# Patient Record
Sex: MALE | Race: WHITE
[De-identification: names, ages, dates, MRNs, and addresses within clinical notes are randomized per-mention and may not be internally consistent; named-entity substitution may affect disease eponyms.]

---

## 2019-01-05 ENCOUNTER — HOSPITAL ENCOUNTER (EMERGENCY)
Dept: HOSPITAL 25 - UCEAST | Age: 64
Discharge: HOME | End: 2019-01-05
Payer: MEDICARE

## 2019-01-05 VITALS — SYSTOLIC BLOOD PRESSURE: 137 MMHG | DIASTOLIC BLOOD PRESSURE: 88 MMHG

## 2019-01-05 DIAGNOSIS — Z79.899: ICD-10-CM

## 2019-01-05 DIAGNOSIS — E03.9: ICD-10-CM

## 2019-01-05 DIAGNOSIS — H10.9: Primary | ICD-10-CM

## 2019-01-05 DIAGNOSIS — H66.91: ICD-10-CM

## 2019-01-05 PROCEDURE — G0463 HOSPITAL OUTPT CLINIC VISIT: HCPCS

## 2019-01-05 PROCEDURE — 99212 OFFICE O/P EST SF 10 MIN: CPT

## 2019-01-05 NOTE — UC
Throat Pain/Nasal Toan HPI





- HPI Summary


HPI Summary: 


63 year old man with disability from past concussion and left carotid dissection

, with 2 week history of sinus congestion with blood streaked nasal discharge. 

Using daily saline spray with some relief, but in the past 2 days has increase 

in eye discharge without photophobia or visual blurring. Left ear with mild 

pain and blunted hearing. Grandchildren have had eye infections. 





- History of Current Complaint


Stated Complaint: SINUS ISSUE EYE ISSUE


Time Seen by Provider: 01/05/19 08:40


Hx Obtained From: Patient


Onset/Duration: Gradual Onset, Lasting Days


Severity: Moderate


Cough: Nonproductive


Associated Signs & Symptoms: Positive: Sinus Discomfort





- Epiglottits Risk Factors


Epiglottis Risk Factors: Negative





- Allergies/Home Medications


Allergies/Adverse Reactions: 


 Allergies











Allergy/AdvReac Type Severity Reaction Status Date / Time


 


No Known Allergies Allergy   Verified 01/05/19 08:43











Home Medications: 


 Home Medications





Levothyroxine TAB* [Synthroid TAB*] 125 mcg PO 0800 01/05/19 [History Confirmed 

01/05/19]











PMH/Surg Hx/FS Hx/Imm Hx


Endocrine History: Hypothyroidism


Neurological History: Other - history of left carotid dissection in the past, 

with persistent short term memory loss.





- Family History


Known Family History: Positive: Non-Contributory





- Social History


Occupation: Disabled


Lives: With Family





Review of Systems


All Other Systems Reviewed And Are Negative: Yes


Constitutional: Positive: Negative


Skin: Positive: Negative


Eyes: Positive: Negative


ENT: Positive: Sore Throat, Ear Ache, Sinus Congestion


Respiratory: Positive: Cough


Cardiovascular: Positive: Negative


Gastrointestinal: Positive: Negative


Genitourinary: Positive: Negative


Motor: Positive: Negative


Neurovascular: Positive: Negative


Musculoskeletal: Positive: Negative


Neurological: Positive: Negative


Psychological: Positive: Negative


Is Patient Immunocompromised?: No





Physical Exam


Triage Information Reviewed: Yes


Completion Of Physical Exam Limited Due To: Other - reports mild memory 

impairment; retrieved information on allergies by calling his wife and me 

calling his pharmacy


Appearance: Ill-Appearing, Pain Distress - mild


Vital Signs Reviewed: Yes


Eye Exam: Other - mild left ptosis, MIRA without photophobia.


Eyes: Positive: Conjunctiva Inflamed


ENT: Positive: Pharyngeal erythema, TM dull


Dental Exam: Normal


Neck: Positive: Supple, Nontender, No Lymphadenopathy


Respiratory: Positive: Lungs clear, Normal breath sounds


Cardiovascular: Positive: RRR, No Murmur


Musculoskeletal Exam: Normal


Neurological: Positive: Alert


Skin Exam: Other





Throat Pain/Nasal Course/Dx





- Course


Course Of Treatment: azithromycin for right otitis/sinusitis and polytrim drops 

for treatment of conjunctivitis





- Differential Dx/Diagnosis


Differential Diagnosis/HQI/PQRI: Otitis Media, Pharyngitis, Sinusitis, URI, 

Other - conjunctivitis


Provider Diagnosis: 


 Bilateral conjunctivitis, Right otitis media








Discharge





- Sign-Out/Discharge


Documenting (check all that apply): Patient Departure


All imaging exams completed and their final reports reviewed: No Studies





- Discharge Plan


Condition: Stable


Disposition: HOME


Prescriptions: 


Azithromyxin NILES (NF) [Z-Niles (Zithromax) 250 mg tabs #6] 2 tab PO .TODAY, THEN 

1 DAILY #6 tab


Polymyx/Trimethoprim OPTH* [Polytrim OPHTH*] 2 drop BOTH EYES Q3H #1 btl


Patient Education Materials:  Conjunctivitis (ED), Ear Infection (ED)


Referrals: 


Skiff MD,Mukul PALOMINO [Primary Care Provider] - 


Additional Instructions: 


Your blood pressure today is mildly elevated; please ensure that you have re-

check within a month. 


You have been prescribed azithromycin for treatment of right ear infection and 

sinus infection. 


Continue use of saline nose spray. 


Use eye drops every 3 hours to both eyes today, then 4 times daily for an 

additional 4 days. 





- Billing Disposition and Condition


Condition: STABLE


Disposition: Home